# Patient Record
Sex: MALE | URBAN - METROPOLITAN AREA
[De-identification: names, ages, dates, MRNs, and addresses within clinical notes are randomized per-mention and may not be internally consistent; named-entity substitution may affect disease eponyms.]

---

## 2021-10-07 ENCOUNTER — POST-OPERATIVE VISIT (OUTPATIENT)
Dept: URBAN - METROPOLITAN AREA CLINIC 21 | Facility: CLINIC | Age: 51
End: 2021-10-07

## 2021-10-07 DIAGNOSIS — Z96.1 PRESENCE OF INTRAOCULAR LENS: Primary | ICD-10-CM

## 2021-10-07 PROCEDURE — 99024 POSTOP FOLLOW-UP VISIT: CPT | Performed by: OPHTHALMOLOGY

## 2021-10-07 ASSESSMENT — VISUAL ACUITY: OS: 20/20

## 2021-10-07 ASSESSMENT — INTRAOCULAR PRESSURE
OD: 9
OS: 8

## 2021-10-07 NOTE — IMPRESSION/PLAN
Impression: S/P [facility] - PC IOL OS - 9 Days. Presence of intraocular lens  Z96.1.  Plan: RTC 1 Month with Dr. Kristian Fuller MRX suggested MRX Progressive QID OS

## 2021-10-19 ENCOUNTER — POST-OPERATIVE VISIT (OUTPATIENT)
Dept: URBAN - METROPOLITAN AREA CLINIC 21 | Facility: CLINIC | Age: 51
End: 2021-10-19

## 2021-10-19 DIAGNOSIS — Z48.810 ENCOUNTER FOR SURGICAL AFTERCARE FOLLOWING SURGERY ON A SENSE ORGAN: Primary | ICD-10-CM

## 2021-10-19 PROCEDURE — 99024 POSTOP FOLLOW-UP VISIT: CPT | Performed by: OPHTHALMOLOGY

## 2021-10-19 ASSESSMENT — INTRAOCULAR PRESSURE: OS: 13

## 2021-10-19 ASSESSMENT — VISUAL ACUITY: OS: 20/20

## 2021-10-19 NOTE — IMPRESSION/PLAN
Impression: S/P [facility] - PC IOL OS - 21 Days. Encounter for surgical aftercare following surgery on a sense organ  Z48.810.  Post operative instructions reviewed - Plan: RTC PRN --Taper Pred-Moxi-Nepaf 1 gtt BID until gone